# Patient Record
Sex: FEMALE | Race: NATIVE HAWAIIAN OR OTHER PACIFIC ISLANDER
[De-identification: names, ages, dates, MRNs, and addresses within clinical notes are randomized per-mention and may not be internally consistent; named-entity substitution may affect disease eponyms.]

---

## 2018-05-27 ENCOUNTER — HOSPITAL ENCOUNTER (EMERGENCY)
Dept: HOSPITAL 5 - ED | Age: 23
Discharge: HOME | End: 2018-05-27
Payer: COMMERCIAL

## 2018-05-27 VITALS — DIASTOLIC BLOOD PRESSURE: 93 MMHG | SYSTOLIC BLOOD PRESSURE: 151 MMHG
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
## 2018-05-27 DIAGNOSIS — S13.4XXA: Primary | ICD-10-CM
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
## 2018-05-27 PROCEDURE — 72040 X-RAY EXAM NECK SPINE 2-3 VW: CPT

## 2018-05-27 PROCEDURE — 99283 EMERGENCY DEPT VISIT LOW MDM: CPT
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
